# Patient Record
Sex: FEMALE | Race: WHITE | Employment: FULL TIME
[De-identification: names, ages, dates, MRNs, and addresses within clinical notes are randomized per-mention and may not be internally consistent; named-entity substitution may affect disease eponyms.]

---

## 2023-06-02 ENCOUNTER — APPOINTMENT (OUTPATIENT)
Facility: HOSPITAL | Age: 43
End: 2023-06-02
Payer: COMMERCIAL

## 2023-06-02 ENCOUNTER — HOSPITAL ENCOUNTER (EMERGENCY)
Facility: HOSPITAL | Age: 43
Discharge: HOME | End: 2023-06-02
Attending: EMERGENCY MEDICINE
Payer: COMMERCIAL

## 2023-06-02 VITALS
WEIGHT: 165 LBS | HEART RATE: 77 BPM | HEIGHT: 68 IN | OXYGEN SATURATION: 99 % | DIASTOLIC BLOOD PRESSURE: 90 MMHG | SYSTOLIC BLOOD PRESSURE: 145 MMHG | RESPIRATION RATE: 20 BRPM | BODY MASS INDEX: 25.01 KG/M2 | TEMPERATURE: 98.5 F

## 2023-06-02 DIAGNOSIS — M25.421 EFFUSION OF JOINT OF RIGHT UPPER ARM: ICD-10-CM

## 2023-06-02 DIAGNOSIS — S52.124A CLOSED NONDISPLACED FRACTURE OF HEAD OF RIGHT RADIUS, INITIAL ENCOUNTER: Primary | ICD-10-CM

## 2023-06-02 PROCEDURE — 73080 X-RAY EXAM OF ELBOW: CPT

## 2023-06-02 PROCEDURE — 6370000000 HC RX 637 (ALT 250 FOR IP): Performed by: NURSE PRACTITIONER

## 2023-06-02 PROCEDURE — 29125 APPL SHORT ARM SPLINT STATIC: CPT

## 2023-06-02 PROCEDURE — 73060 X-RAY EXAM OF HUMERUS: CPT

## 2023-06-02 PROCEDURE — 99283 EMERGENCY DEPT VISIT LOW MDM: CPT

## 2023-06-02 RX ORDER — OXYCODONE HYDROCHLORIDE AND ACETAMINOPHEN 5; 325 MG/1; MG/1
1 TABLET ORAL EVERY 8 HOURS PRN
Qty: 9 TABLET | Refills: 0 | Status: SHIPPED | OUTPATIENT
Start: 2023-06-02 | End: 2023-06-05

## 2023-06-02 RX ORDER — IBUPROFEN 600 MG/1
600 TABLET ORAL EVERY 6 HOURS PRN
Qty: 30 TABLET | Refills: 0 | Status: SHIPPED | OUTPATIENT
Start: 2023-06-02

## 2023-06-02 RX ORDER — OXYCODONE HYDROCHLORIDE AND ACETAMINOPHEN 5; 325 MG/1; MG/1
1 TABLET ORAL
Status: COMPLETED | OUTPATIENT
Start: 2023-06-02 | End: 2023-06-02

## 2023-06-02 RX ADMIN — OXYCODONE HYDROCHLORIDE AND ACETAMINOPHEN 1 TABLET: 5; 325 TABLET ORAL at 20:18

## 2023-06-02 ASSESSMENT — PAIN SCALES - GENERAL: PAINLEVEL_OUTOF10: 7

## 2023-06-02 ASSESSMENT — ENCOUNTER SYMPTOMS
ABDOMINAL PAIN: 0
VOMITING: 0
DIARRHEA: 0
NAUSEA: 0
SHORTNESS OF BREATH: 0
RHINORRHEA: 0

## 2023-06-02 ASSESSMENT — PAIN - FUNCTIONAL ASSESSMENT: PAIN_FUNCTIONAL_ASSESSMENT: 0-10

## 2023-06-02 NOTE — DISCHARGE INSTRUCTIONS
Your evaluation, including imaging, were concerning for an elbow fracture today. You may experience some worsening soreness and pain over the next two days, which is normal after any injury to your bone. Continue to take pain medication as directed. Ice rest, and elevate your injured extremity. If you have a splint applied, you should not get this wet and it should only be removed once you have followed up with orthopedics. If you have been given a sling, please use this as directed when you are active during the day, but not with hygiene activities or when you are sleeping. You should return to the ER if you experience numbness, tingling, weakness, and or other concerning symptoms. You should follow up with orthopedics this week, as you may benefit from their expertise and possible physical therapy in the future. We have likely referred you to a specific orthopedic provider, please call them. If for any reason that we have not referred you to a specific provider,  here is the number for orthopedic groups local for our facilities. Call for an appointment today. [facility]: [phone];  call Monday-Friday: 8 a.m.-5 p.m. [facility]: [phone];  call Monday-Friday: 8 a.m.-5 p.m.

## 2023-06-02 NOTE — ED TRIAGE NOTES
Patient states was moving and slipped on a step, injuring her right arm, mostly around the elbow. States pain extends a bit into the upper arm. Denies numbness, tingling, or coolness distal to the injury; still can feel everything and move her fingers.

## 2023-06-02 NOTE — ED PROVIDER NOTES
[facility] EMERGENCY DEPT  EMERGENCY DEPARTMENT ENCOUNTER      Pt Name: Landon Augustine  MRN: [id number]  [place] 1980  Date of evaluation: 6/2/2023  Provider: SHUN Huerta NP    CHIEF COMPLAINT       Chief Complaint   Patient presents with    Arm Injury         HISTORY OF PRESENT ILLNESS   (Location/Symptom, Timing/Onset, Context/Setting, Quality, Duration, Modifying Factors, Severity)  Note limiting factors. The history is provided by the patient. No  was used. Landon Augustine is a 44 y.o. female without any relevant PMhx who presents ambulatory w/ SO to [facility] ED with cc of R arm pain. R elbow pain after falling today, missed step while moving into new residence. States that she landed with arm extended and has concern for R elbow fracture. Denies head/ neck injury or LOC. Has abrasion to R Knee, doesn't have suspicion for worsening knee injury, is able to bear weight without difficulty. Denies any chance of pregnancy. Denies tobacco/ ETOH/ substance abuse. States that she has otherwise been in her [facility]OH prior to     PCP: No primary care provider on file. There are no other complaints, changes or physical findings at this time. Review of External Medical Records:     Nursing Notes were reviewed. REVIEW OF SYSTEMS    (2-9 systems for level 4, 10 or more for level 5)     Review of Systems   Constitutional:  Negative for activity change, appetite change and fever. HENT:  Negative for congestion and rhinorrhea. Eyes:  Negative for visual disturbance. Respiratory:  Negative for shortness of breath. Cardiovascular:  Negative for chest pain. Gastrointestinal:  Negative for abdominal pain, diarrhea, nausea and vomiting. Genitourinary:  Negative for difficulty urinating. Musculoskeletal:  Positive for arthralgias, joint swelling and myalgias. Skin:  Negative for rash. Neurological:  Negative for weakness and headaches.      Except as noted

## 2023-06-03 NOTE — ED NOTES
Patient discharged via ambulation in stable condition with no further questions at this time.      Shaila Farmer RN  06/02/23 3708